# Patient Record
Sex: MALE | URBAN - METROPOLITAN AREA
[De-identification: names, ages, dates, MRNs, and addresses within clinical notes are randomized per-mention and may not be internally consistent; named-entity substitution may affect disease eponyms.]

---

## 2023-12-06 ENCOUNTER — TELEPHONE (OUTPATIENT)
Dept: GASTROENTEROLOGY | Facility: CLINIC | Age: 63
End: 2023-12-06

## 2023-12-06 RX ORDER — TAMSULOSIN HYDROCHLORIDE 0.4 MG/1
0.4 CAPSULE ORAL NIGHTLY
COMMUNITY
Start: 2023-11-28

## 2023-12-06 RX ORDER — EMPAGLIFLOZIN 25 MG/1
25 TABLET, FILM COATED ORAL
COMMUNITY
Start: 2023-11-28

## 2023-12-06 RX ORDER — VALSARTAN 160 MG/1
160 TABLET ORAL DAILY
COMMUNITY
Start: 2023-09-06

## 2023-12-06 RX ORDER — SPIRONOLACTONE 25 MG/1
25 TABLET ORAL DAILY
COMMUNITY
Start: 2023-10-09

## 2023-12-06 RX ORDER — METOPROLOL SUCCINATE 25 MG/1
25 TABLET, EXTENDED RELEASE ORAL DAILY
COMMUNITY
Start: 2023-08-02

## 2023-12-06 RX ORDER — TICAGRELOR 60 MG/1
60 TABLET ORAL 2 TIMES DAILY
COMMUNITY
Start: 2023-11-28

## 2023-12-06 RX ORDER — ATORVASTATIN CALCIUM 80 MG/1
80 TABLET, FILM COATED ORAL NIGHTLY
COMMUNITY
Start: 2023-11-28

## 2023-12-06 NOTE — TELEPHONE ENCOUNTER
Pt would like his colonoscopy done at [facility]. Pt doesn't have any information in his chart. I added his meds. Pt doesn't have diabetes, CKD, CHF, or a defibrillator or a pacemaker. He only takes Aspirin for blood thinners.   Prep instructions will be sent to his confirmed address.

## 2023-12-06 NOTE — TELEPHONE ENCOUNTER
----- Message from Ronna Briones LPN sent at 12/5/2023  3:31 PM CST -----    ----- Message -----  From: Charlene Hale  Sent: 12/5/2023   3:26 PM CST  To: [facility] Clinical Staff    Type:  Appointment Request    Name of Caller:Patients wife   When is the first available appointment?No access  Symptoms:colonoscopy screening   Would the patient rather a call back or a response via [facility]? Call back   Best Call Back Number:[phone]  Additional Information: patients wife indicates the patient would like to schedule to have a colonoscopy screening. Patients wife indicates she would like to schedule an appointment. Patients wife indicates she would like to schedule for the soonest available. Please call back with further assistance and more information. Patient is covered by [employer] but does not have information to update chart.

## 2023-12-07 NOTE — TELEPHONE ENCOUNTER
Letter is mailed. Pt is instructed to reach out to PCP or Cardiologist for Brilinta clearance since we don't have his history or any of his outside records

## 2024-02-01 ENCOUNTER — TELEPHONE (OUTPATIENT)
Dept: ENDOSCOPY | Facility: HOSPITAL | Age: 64
End: 2024-02-01